# Patient Record
Sex: FEMALE | Race: WHITE | NOT HISPANIC OR LATINO | Employment: UNEMPLOYED | RURAL
[De-identification: names, ages, dates, MRNs, and addresses within clinical notes are randomized per-mention and may not be internally consistent; named-entity substitution may affect disease eponyms.]

---

## 2020-06-29 ENCOUNTER — HISTORICAL (OUTPATIENT)
Dept: ADMINISTRATIVE | Facility: HOSPITAL | Age: 60
End: 2020-06-29

## 2020-06-29 LAB
ALBUMIN SERPL BCP-MCNC: 3.8 G/DL (ref 3.5–5)
ALBUMIN/GLOB SERPL: 1 {RATIO}
ALP SERPL-CCNC: 95 U/L (ref 46–118)
ALT SERPL W P-5'-P-CCNC: 30 U/L (ref 13–56)
AST SERPL W P-5'-P-CCNC: 36 U/L (ref 15–37)
BACTERIA #/AREA URNS HPF: ABNORMAL /HPF
BILIRUB SERPL-MCNC: 0.3 MG/DL (ref 0–1.2)
BILIRUB UR QL STRIP: NEGATIVE MG/DL
BUN SERPL-MCNC: 46 MG/DL (ref 7–18)
BUN/CREAT SERPL: 21.7
CALCIUM SERPL-MCNC: 8.9 MG/DL (ref 8.5–10.1)
CHLORIDE SERPL-SCNC: 100 MMOL/L (ref 98–107)
CLARITY UR: CLEAR
CLARITY UR: CLEAR
CO2 SERPL-SCNC: 25 MMOL/L (ref 21–32)
COLOR UR: YELLOW
COLOR UR: YELLOW
CREAT SERPL-MCNC: 2.12 MG/DL (ref 0.55–1.02)
FLUAV AG UPPER RESP QL IA.RAPID: NEGATIVE
FLUBV AG UPPER RESP QL IA.RAPID: NEGATIVE
GLOBULIN SER-MCNC: 4 G/DL (ref 2–4)
GLUCOSE SERPL-MCNC: 106 MG/DL (ref 74–106)
GLUCOSE UR STRIP-MCNC: NORMAL MG/DL
HYALINE CASTS #/AREA URNS LPF: ABNORMAL /LPF (ref 0–2)
KETONES UR STRIP-SCNC: NEGATIVE MG/DL
LEUKOCYTE ESTERASE UR QL STRIP: ABNORMAL LEU/UL
MAGNESIUM SERPL-MCNC: 2.2 MG/DL (ref 1.7–2.3)
MUCOUS THREADS #/AREA URNS HPF: ABNORMAL /HPF
NITRITE UR QL STRIP: POSITIVE
NT-PROBNP SERPL-MCNC: 145 PG/ML (ref 0–125)
PH UR STRIP: 5 PH UNITS (ref 5–8)
POTASSIUM SERPL-SCNC: 3.4 MMOL/L (ref 3.5–5.1)
PROT SERPL-MCNC: 7.8 G/DL (ref 6.4–8.2)
PROT UR QL STRIP: NEGATIVE MG/DL
RAPID GROUP A STREP: NEGATIVE
RBC # UR STRIP: NEGATIVE ERY/UL
RBC #/AREA URNS HPF: ABNORMAL /HPF (ref 0–3)
SARS-COV+SARS-COV-2 AG RESP QL IA.RAPID: NEGATIVE
SODIUM SERPL-SCNC: 138 MMOL/L (ref 136–145)
SP GR UR STRIP: 1.02 (ref 1–1.03)
SQUAMOUS #/AREA URNS LPF: ABNORMAL /LPF
UROBILINOGEN UR STRIP-ACNC: 0.2 MG/DL
WBC #/AREA URNS HPF: ABNORMAL /HPF (ref 0–5)

## 2020-06-30 ENCOUNTER — HISTORICAL (OUTPATIENT)
Dept: ADMINISTRATIVE | Facility: HOSPITAL | Age: 60
End: 2020-06-30

## 2020-07-02 LAB
REPORT: 38
REPORT: NORMAL

## 2020-10-03 ENCOUNTER — HISTORICAL (OUTPATIENT)
Dept: ADMINISTRATIVE | Facility: HOSPITAL | Age: 60
End: 2020-10-03

## 2020-10-03 LAB
ALBUMIN SERPL BCP-MCNC: 2.9 G/DL (ref 3.5–5)
ALBUMIN/GLOB SERPL: 0.6 {RATIO}
ALP SERPL-CCNC: 119 U/L (ref 46–118)
ALT SERPL W P-5'-P-CCNC: 16 U/L (ref 13–56)
ANION GAP SERPL CALCULATED.3IONS-SCNC: 18 MMOL/L
AST SERPL W P-5'-P-CCNC: 20 U/L (ref 15–37)
BASOPHILS # BLD AUTO: 0.02 X10E3/UL (ref 0–0.2)
BASOPHILS NFR BLD AUTO: 0.1 % (ref 0–1)
BILIRUB SERPL-MCNC: 0.5 MG/DL (ref 0–1.2)
BUN SERPL-MCNC: 30 MG/DL (ref 7–18)
BUN/CREAT SERPL: 10.9
CALCIUM SERPL-MCNC: 8.9 MG/DL (ref 8.5–10.1)
CHLORIDE SERPL-SCNC: 97 MMOL/L (ref 98–107)
CO2 SERPL-SCNC: 25 MMOL/L (ref 21–32)
CREAT SERPL-MCNC: 2.75 MG/DL (ref 0.55–1.02)
D DIMER PPP FEU-MCNC: 3.57 UG/ML (ref 0–0.47)
EOSINOPHIL # BLD AUTO: 0.01 X10E3/UL (ref 0–0.5)
EOSINOPHIL NFR BLD AUTO: 0 % (ref 1–4)
ERYTHROCYTE [DISTWIDTH] IN BLOOD BY AUTOMATED COUNT: 12.8 % (ref 11.5–14.5)
GLOBULIN SER-MCNC: 4.9 G/DL (ref 2–4)
GLUCOSE SERPL-MCNC: 145 MG/DL (ref 74–106)
HCT VFR BLD AUTO: 38.5 % (ref 38–47)
HGB BLD-MCNC: 13 G/DL (ref 12–16)
IMM GRANULOCYTES # BLD AUTO: 0.09 X10E3/UL (ref 0–0.04)
IMM GRANULOCYTES NFR BLD: 0.4 % (ref 0–0.4)
LACTATE SERPL-SCNC: 5.7 MMOL/L (ref 0.4–2)
LYMPHOCYTES # BLD AUTO: 1.18 X10E3/UL (ref 1–4.8)
LYMPHOCYTES NFR BLD AUTO: 5.4 % (ref 27–41)
MAGNESIUM SERPL-MCNC: 1.9 MG/DL (ref 1.7–2.3)
MCH RBC QN AUTO: 31.8 PG (ref 27–31)
MCHC RBC AUTO-ENTMCNC: 33.8 G/DL (ref 32–36)
MCV RBC AUTO: 94.1 FL (ref 80–96)
MONOCYTES # BLD AUTO: 1.69 X10E3/UL (ref 0–0.8)
MONOCYTES NFR BLD AUTO: 7.7 % (ref 2–6)
MPC BLD CALC-MCNC: 10.5 FL (ref 9.4–12.4)
NEUTROPHILS # BLD AUTO: 19 X10E3/UL (ref 1.8–7.7)
NEUTROPHILS NFR BLD AUTO: 86.4 % (ref 53–65)
PLATELET # BLD AUTO: 140 X10E3/UL (ref 150–400)
POTASSIUM SERPL-SCNC: 3.7 MMOL/L (ref 3.5–5.1)
PROT SERPL-MCNC: 7.8 G/DL (ref 6.4–8.2)
RBC # BLD AUTO: 4.09 X10E6/UL (ref 4.2–5.4)
SARS-COV+SARS-COV-2 AG RESP QL IA.RAPID: NEGATIVE
SODIUM SERPL-SCNC: 136 MMOL/L (ref 136–145)
WBC # BLD AUTO: 21.99 X10E3/UL (ref 4.5–11)

## 2020-10-04 ENCOUNTER — HISTORICAL (OUTPATIENT)
Dept: ADMINISTRATIVE | Facility: HOSPITAL | Age: 60
End: 2020-10-04

## 2020-10-04 LAB
BACTERIA #/AREA URNS HPF: ABNORMAL /HPF
BILIRUB UR QL STRIP: NEGATIVE MG/DL
CLARITY UR: ABNORMAL
CLARITY UR: ABNORMAL
COLOR UR: YELLOW
COLOR UR: YELLOW
GLUCOSE UR STRIP-MCNC: NORMAL MG/DL
KETONES UR STRIP-SCNC: NEGATIVE MG/DL
LEUKOCYTE ESTERASE UR QL STRIP: ABNORMAL LEU/UL
NITRITE UR QL STRIP: POSITIVE
PH UR STRIP: 5.5 PH UNITS (ref 5–8)
PROT UR QL STRIP: >=300 MG/DL
RBC # UR STRIP: ABNORMAL ERY/UL
RBC #/AREA URNS HPF: ABNORMAL /HPF (ref 0–3)
SP GR UR STRIP: >=1.03 (ref 1–1.03)
SQUAMOUS #/AREA URNS LPF: ABNORMAL /LPF
UROBILINOGEN UR STRIP-ACNC: NORMAL MG/DL
WBC #/AREA URNS HPF: ABNORMAL /HPF (ref 0–5)

## 2020-10-05 ENCOUNTER — HISTORICAL (OUTPATIENT)
Dept: ADMINISTRATIVE | Facility: HOSPITAL | Age: 60
End: 2020-10-05

## 2020-10-06 LAB
REPORT: 38
REPORT: NORMAL

## 2020-10-07 LAB
REPORT: 38
REPORT: 38
REPORT: NORMAL

## 2021-08-08 ENCOUNTER — HOSPITAL ENCOUNTER (EMERGENCY)
Facility: HOSPITAL | Age: 61
Discharge: HOME | End: 2021-08-08
Attending: SURGERY
Payer: MEDICAID

## 2021-08-08 VITALS
SYSTOLIC BLOOD PRESSURE: 199 MMHG | RESPIRATION RATE: 20 BRPM | HEIGHT: 64 IN | TEMPERATURE: 102 F | HEART RATE: 86 BPM | DIASTOLIC BLOOD PRESSURE: 97 MMHG | OXYGEN SATURATION: 98 % | BODY MASS INDEX: 34.15 KG/M2 | WEIGHT: 200 LBS

## 2021-08-08 DIAGNOSIS — R50.9 FEVER, UNSPECIFIED FEVER CAUSE: ICD-10-CM

## 2021-08-08 DIAGNOSIS — J02.8 SORE THROAT (VIRAL): ICD-10-CM

## 2021-08-08 DIAGNOSIS — B97.89 SORE THROAT (VIRAL): ICD-10-CM

## 2021-08-08 DIAGNOSIS — B34.9 VIRAL SYNDROME: Primary | ICD-10-CM

## 2021-08-08 LAB
FLUAV AG UPPER RESP QL IA.RAPID: NEGATIVE
FLUBV AG UPPER RESP QL IA.RAPID: NEGATIVE
SARS-COV+SARS-COV-2 AG RESP QL IA.RAPID: NEGATIVE

## 2021-08-08 PROCEDURE — 87428 SARSCOV & INF VIR A&B AG IA: CPT | Performed by: SURGERY

## 2021-08-08 PROCEDURE — 99282 EMERGENCY DEPT VISIT SF MDM: CPT | Mod: ,,, | Performed by: SURGERY

## 2021-08-08 PROCEDURE — 99282 PR EMERGENCY DEPT VISIT,LEVEL II: ICD-10-PCS | Mod: ,,, | Performed by: SURGERY

## 2021-08-08 PROCEDURE — 99283 EMERGENCY DEPT VISIT LOW MDM: CPT

## 2021-08-08 RX ORDER — ESTRADIOL 0.1 MG/D
1 FILM, EXTENDED RELEASE TRANSDERMAL
COMMUNITY

## 2021-08-08 RX ORDER — MULTIVITAMIN
1 TABLET ORAL DAILY
COMMUNITY

## 2021-08-08 RX ORDER — LEVOTHYROXINE SODIUM 150 UG/1
150 TABLET ORAL DAILY
COMMUNITY

## 2021-08-09 ENCOUNTER — TELEPHONE (OUTPATIENT)
Dept: EMERGENCY MEDICINE | Facility: HOSPITAL | Age: 61
End: 2021-08-09

## 2021-08-10 RX ORDER — DIPHENHYDRAMINE HYDROCHLORIDE 50 MG/ML
25 INJECTION INTRAMUSCULAR; INTRAVENOUS ONCE AS NEEDED
Status: CANCELLED | OUTPATIENT
Start: 2021-08-10 | End: 2033-01-06

## 2021-08-10 RX ORDER — ALBUTEROL SULFATE 90 UG/1
2 AEROSOL, METERED RESPIRATORY (INHALATION)
Status: CANCELLED | OUTPATIENT
Start: 2021-08-10

## 2021-08-10 RX ORDER — ACETAMINOPHEN 325 MG/1
650 TABLET ORAL ONCE AS NEEDED
Status: CANCELLED | OUTPATIENT
Start: 2021-08-10 | End: 2033-01-06

## 2021-08-10 RX ORDER — SODIUM CHLORIDE 0.9 % (FLUSH) 0.9 %
10 SYRINGE (ML) INJECTION
Status: CANCELLED | OUTPATIENT
Start: 2021-08-10

## 2021-08-10 RX ORDER — ONDANSETRON 4 MG/1
4 TABLET, ORALLY DISINTEGRATING ORAL ONCE AS NEEDED
Status: CANCELLED | OUTPATIENT
Start: 2021-08-10 | End: 2033-01-06

## 2021-08-11 ENCOUNTER — INFUSION (OUTPATIENT)
Dept: INFECTIOUS DISEASES | Facility: HOSPITAL | Age: 61
End: 2021-08-11
Attending: INTERNAL MEDICINE
Payer: MEDICAID

## 2021-08-11 VITALS
DIASTOLIC BLOOD PRESSURE: 67 MMHG | HEART RATE: 71 BPM | OXYGEN SATURATION: 96 % | HEIGHT: 64 IN | BODY MASS INDEX: 32.44 KG/M2 | TEMPERATURE: 98 F | WEIGHT: 190 LBS | SYSTOLIC BLOOD PRESSURE: 100 MMHG | RESPIRATION RATE: 18 BRPM

## 2021-08-11 DIAGNOSIS — U07.1 COVID-19: Primary | ICD-10-CM

## 2021-08-11 PROCEDURE — 63600175 PHARM REV CODE 636 W HCPCS: Performed by: INTERNAL MEDICINE

## 2021-08-11 PROCEDURE — 25000003 PHARM REV CODE 250: Performed by: INTERNAL MEDICINE

## 2021-08-11 PROCEDURE — M0243 CASIRIVI AND IMDEVI INFUSION: HCPCS | Performed by: INTERNAL MEDICINE

## 2021-08-11 RX ORDER — EPINEPHRINE 0.3 MG/.3ML
0.3 INJECTION SUBCUTANEOUS
Status: ACTIVE | OUTPATIENT
Start: 2021-08-11

## 2021-08-11 RX ORDER — ALBUTEROL SULFATE 90 UG/1
2 AEROSOL, METERED RESPIRATORY (INHALATION)
Status: ACTIVE | OUTPATIENT
Start: 2021-08-11

## 2021-08-11 RX ORDER — ACETAMINOPHEN 325 MG/1
650 TABLET ORAL ONCE AS NEEDED
Status: COMPLETED | OUTPATIENT
Start: 2021-08-11 | End: 2021-08-11

## 2021-08-11 RX ORDER — SODIUM CHLORIDE 0.9 % (FLUSH) 0.9 %
10 SYRINGE (ML) INJECTION
Status: ACTIVE | OUTPATIENT
Start: 2021-08-11

## 2021-08-11 RX ORDER — DIPHENHYDRAMINE HYDROCHLORIDE 50 MG/ML
25 INJECTION INTRAMUSCULAR; INTRAVENOUS ONCE AS NEEDED
Status: ACTIVE | OUTPATIENT
Start: 2021-08-11 | End: 2033-01-06

## 2021-08-11 RX ORDER — ACETAMINOPHEN 325 MG/1
TABLET ORAL
Status: DISCONTINUED
Start: 2021-08-11 | End: 2021-08-11 | Stop reason: HOSPADM

## 2021-08-11 RX ORDER — ONDANSETRON 4 MG/1
4 TABLET, ORALLY DISINTEGRATING ORAL ONCE AS NEEDED
Status: ACTIVE | OUTPATIENT
Start: 2021-08-11 | End: 2033-01-06

## 2021-08-11 RX ADMIN — ACETAMINOPHEN 650 MG: 325 TABLET, FILM COATED ORAL at 05:08

## 2021-08-11 RX ADMIN — CASIRIVIMAB AND IMDEVIMAB 600 MG: 600; 600 INJECTION, SOLUTION, CONCENTRATE INTRAVENOUS at 05:08

## 2021-10-07 DIAGNOSIS — Z96.651 S/P TOTAL KNEE REPLACEMENT, RIGHT: Primary | ICD-10-CM

## 2021-10-12 ENCOUNTER — CLINICAL SUPPORT (OUTPATIENT)
Dept: REHABILITATION | Facility: HOSPITAL | Age: 61
End: 2021-10-12
Payer: MEDICAID

## 2021-10-12 DIAGNOSIS — R29.898 RIGHT LEG WEAKNESS: ICD-10-CM

## 2021-10-12 DIAGNOSIS — M25.561 ACUTE PAIN OF RIGHT KNEE: ICD-10-CM

## 2021-10-12 DIAGNOSIS — Z96.651 S/P TOTAL KNEE REPLACEMENT, RIGHT: ICD-10-CM

## 2021-10-12 PROCEDURE — 97161 PT EVAL LOW COMPLEX 20 MIN: CPT

## 2021-10-12 PROCEDURE — 97110 THERAPEUTIC EXERCISES: CPT

## 2021-10-12 PROCEDURE — 97116 GAIT TRAINING THERAPY: CPT

## 2021-10-14 ENCOUNTER — CLINICAL SUPPORT (OUTPATIENT)
Dept: REHABILITATION | Facility: HOSPITAL | Age: 61
End: 2021-10-14
Payer: MEDICAID

## 2021-10-14 DIAGNOSIS — R29.898 RIGHT LEG WEAKNESS: ICD-10-CM

## 2021-10-14 DIAGNOSIS — M25.561 ACUTE PAIN OF RIGHT KNEE: ICD-10-CM

## 2021-10-14 PROCEDURE — 97530 THERAPEUTIC ACTIVITIES: CPT

## 2021-10-14 PROCEDURE — 97110 THERAPEUTIC EXERCISES: CPT

## 2021-10-18 ENCOUNTER — DOCUMENTATION ONLY (OUTPATIENT)
Dept: REHABILITATION | Facility: HOSPITAL | Age: 61
End: 2021-10-18

## 2021-10-18 PROBLEM — R29.898 RIGHT LEG WEAKNESS: Status: RESOLVED | Noted: 2021-10-12 | Resolved: 2021-10-18

## 2021-10-18 PROBLEM — M25.561 RIGHT KNEE PAIN: Status: RESOLVED | Noted: 2021-10-12 | Resolved: 2021-10-18

## 2023-03-15 DIAGNOSIS — Z89.611 HX OF AKA (ABOVE KNEE AMPUTATION), RIGHT: Primary | ICD-10-CM

## 2023-03-16 ENCOUNTER — CLINICAL SUPPORT (OUTPATIENT)
Dept: REHABILITATION | Facility: HOSPITAL | Age: 63
End: 2023-03-16
Payer: MEDICAID

## 2023-03-16 DIAGNOSIS — R52 PAIN: ICD-10-CM

## 2023-03-16 DIAGNOSIS — Z89.611 HX OF AKA (ABOVE KNEE AMPUTATION), RIGHT: ICD-10-CM

## 2023-03-16 PROCEDURE — 97116 GAIT TRAINING THERAPY: CPT

## 2023-03-16 PROCEDURE — 97110 THERAPEUTIC EXERCISES: CPT

## 2023-03-16 PROCEDURE — 97162 PT EVAL MOD COMPLEX 30 MIN: CPT

## 2023-03-16 NOTE — PLAN OF CARE
[facility]  Physical Therapy Initial Evaluation    Name: Leigha Nieves  Clinic Number: [id number]    Therapy Diagnosis:   Encounter Diagnosis   Name Primary?    Hx of AKA (above knee amputation), right      Physician: Jose Manuel Car II, MD    Physician Orders: PT Eval and Treat   Medical Diagnosis from Referral: History of AKA (above knee amputation), R   Evaluation Date: 3/16/2023  Authorization Period Expiration: 3/14/2024  Plan of Care Expiration: 4/14/2023  Visit # / Visits authorized: 1/8    Time In: 14:00  Time Out: 14:49  Total Appointment Time (timed & untimed codes): 49 minutes    Precautions: Fall    Subjective   Date of onset: 2/27/2023  History of current condition - Leigha reports: Patient s/p R AKA on 2/27/2023. Patient has had multiple knee surgeries that started 10/7/2021 with R TKR. Patient had infection in replacement that would not heal despite MD max efforts resulting in eight surgeries. Patient released from the hospital 3/14/2023. Patient unsure of when to return to MD. Patient reports planning to get prosthesis. Patient reports extreme phantom pains. Patient reports there will be a law suit surrounding initial surgery. Patient referred for post amputation care.      Medical History:   Past Medical History:   Diagnosis Date    Arthritis     Depression     Migraine headache        Surgical History:   Leigha Nieves  has a past surgical history that includes Joint replacement and Hysterectomy.    Medications:   Leigha has a current medication list which includes the following prescription(s): estradiol, levothyroxine, multivitamin, and oxymorphone hcl, and the following Facility-Administered Medications: albuterol, diphenhydramine, epinephrine, methylprednisolone sodium succinate, ondansetron, sodium chloride 0.9% 500 mL flush bag, sodium chloride 0.9% 500 mL flush bag, and sodium chloride 0.9%.    Allergies:   Review of patient's allergies indicates:   Allergen Reactions    Phenergan  "plain     Toradol [ketorolac]         Imaging, - patient unsure, possible scan of head    Prior Therapy: on R LE following replacement for 2 visits   Social History:  lives with an adult   Occupation: unemployed on disability- unsure on reason of disability  Prior Level of Function: active  Current Level of Function: sedentary    Pain:  Current 7/10, worst 9/10, best 7/10   Location: right LE   Description: Throbbing and Shooting, sharp   Aggravating Factors: Sitting without support  Easing Factors: rest and elevation    Pts goals: "get strong"     Objective     Incisions: covered with fresh bandages  Girth Measurements: Right 61 cm Left 51 cm      Range of motion:  Motion Right Left    Hip flexion   100  WITHIN FUNCTIONAL LIMITS   Hip extension   25  WITHIN FUNCTIONAL LIMITS   Hip abduction   45  WITHIN FUNCTIONAL LIMITS   Hip adduction  WITHIN FUNCTIONAL LIMITS  WITHIN FUNCTIONAL LIMITS       Manual muscle test   Muscle Right  Left    Hip flexion  MMT strength: 4-/5  MMT strength: 4/5   Hip extension  MMT strength: 4-/5  MMT strength: 4/5   Hip abduction  MMT strength: 3+/5  MMT strength: 4/5   Hip adduction  MMT strength: 4-/5  MMT strength: 4+/5       Gait:  Assistive device: rolling walker  Ambulation distance and deviations: 35' with increased L step length, increased speed, head down posture  Stairs: not performed  Comments: gait belt used with cuing to decrease speed of movement to improve safety      Clinical Special Tests:  N/A post op      TREATMENT   Treatment Time In: 14:17  Treatment Time Out: 14:49  Total Treatment time (time-based codes) separate from Evaluation: 32 minutes    Leigha received the treatments listed below:  THERAPEUTIC EXERCISES to develop strength, endurance, ROM, flexibility, posture, and core stabilization for 24 minutes including see flowsheet below  GAIT TRAINING to improve functional mobility and safety for 8 minutes.      Ther- Ex    Nustep/Bike    Hamstring Stretch  " "  Heelslides    Quad sets  20 x 3"   Glute sets 20 x 3"   Straight Leg Raises 2 x 10    Sidelying hip abduction 2 x 10   Prone hip extension Unable due to cramping   Sidelying hip circles 15 x (cramping unable to do more)               Ther-Act    3 way hip    Sit <> stands             Bed mobility performed        Home Exercises and Patient Education Provided    Education provided:   - eval findings, Plan of Care, Home exercise program     Written Home Exercises Provided: yes.  Exercises were reviewed and Leigha was able to demonstrate them prior to the end of the session.  Leigha demonstrated fair  understanding of the education provided.     See EMR under Patient Instructions for exercises provided 3/16/2023.    Assessment   Leigha is a 63 y.o. female referred to outpatient Physical Therapy with a medical diagnosis of R AKA. Pt presents with increased pain, decreased ROM, increased edema, and decreased strength that limits functional mobility. Patient with plans to have prosthesis as healing allows. Patient performed therapeutic exercises with max verbal and visual cuing. Patient with difficulty activating proper musculature to perform therapeutic exercises despite cuing. During exercises, patient experienced LE cramping that limited further repetitions. Gait performed with gait belt and increased fall risk due to impulsivity and decreased safety awareness. No adverse effects from treatment noted or reported.     Pt prognosis is Good.   Pt will benefit from skilled outpatient Physical Therapy to address the deficits stated above and in the chart below, provide pt/family education, and to maximize pt's level of independence.     Plan of care discussed with patient: Yes  Pt's spiritual, cultural and educational needs considered and patient is agreeable to the plan of care and goals as stated below:     Anticipated Barriers for therapy: compliance with Home exercise program, decreased safety awareness "     Goals:  Patient will increase R hip flexion to 115 degrees to allow patient to perform activities of daily living without limitations  Patient will increase B lower extremity strength to 4+/5 to allow patient to ambulate without deviations  Patient will report decrease in pain of R LE to 4/10 to improve quality of life  Patient will ascend and descend 4 steps with B handrails with sup assistance to allow patient to safely maneuver environment     Plan   Plan of care Certification: 3/16/2023 to 4/14/2023.    Outpatient Physical Therapy 2 times weekly for 4 weeks to include the following interventions: Electrical Stimulation IFC/Biphasic, Gait Training, Manual Therapy, Moist Heat/ Ice, Neuromuscular Re-ed, Orthotic Management and Training, Patient Education, Self Care, Therapeutic Activities, and Therapeutic Exercise.     Michelle Alvarez, PT, DPT 3/16/2023      Physician Signature: ____________________________________________ Date: __________

## 2023-03-30 ENCOUNTER — CLINICAL SUPPORT (OUTPATIENT)
Dept: REHABILITATION | Facility: HOSPITAL | Age: 63
End: 2023-03-30
Payer: MEDICAID

## 2023-03-30 DIAGNOSIS — R52 PAIN: Primary | ICD-10-CM

## 2023-03-30 PROCEDURE — 97110 THERAPEUTIC EXERCISES: CPT

## 2023-03-30 PROCEDURE — 97530 THERAPEUTIC ACTIVITIES: CPT

## 2023-03-30 NOTE — PLAN OF CARE
Patient was admitted into ICU on 3/19/2023 according to med notes in Epic system. Patient unsure of dates and does not recall hospitalization upon first entering clinic. Patient reports not feeling well but transferred self to mat. No mobility changes noted post hospitalization with bed mobility but gait not attempted due to BP issues that arose during treatment. PT re-eval to be attempted but unable due to inability to fully assess gait. Continue treatment as warranted by eval if able at next visit if continued BP unstable. D/C back to MD.

## 2023-03-30 NOTE — PROGRESS NOTES
"  [facility]   Physical Therapy Treatment Note     Name: Leigha Nieves  Clinic Number: [id number]    Therapy Diagnosis:   Encounter Diagnosis   Name Primary?    Pain Yes     Physician: Jose Manuel Car II, MD    Visit Date: 3/30/2023    Physician Orders: PT Eval and Treat   Medical Diagnosis from Referral: History of AKA (above knee amputation), R   Evaluation Date: 3/16/2023  Authorization Period Expiration: 3/14/2024  Plan of Care Expiration: 4/14/2023  Visit # / Visits authorized: 2/8    PTA Visit #: 0/5     Time In: 12:07  Time Out: 12:41  Total Billable Time: 34 minutes    SUBJECTIVE     Pt reports: "I don't feel good today." PT questioned patient about time in the hospital and patient stated she was not in the hospital. PT found notes reported ICU stay. See documentation in treatment tab for more information.     Patient rolls into clinic in W/C with pale appearance and reports of feeling poorly. BP was checked with readings 101/71 and heart rate 80. "Since I am here, I am going to do something." Patient transferred to [facility].     She was not compliant with home exercise program.  Response to previous treatment: unknown  Functional change: too early in POC    Pain: 5/10  Location: right upper legs     OBJECTIVE     Objective Measures updated at progress report unless specified.     Treatment     Leigha received the treatments listed below:      THERAPEUTIC EXERCISES to develop strength, endurance, ROM, flexibility, posture, and core stabilization including see flowsheet below        Ther- Ex     Nustep/Bike     Hamstring Stretch     Heelslides     Quad sets  20 x 3"   Glute sets 20 x 3"   Straight Leg Raises 2 x 10    Sidelying hip abduction 2 x 10   Prone hip extension    Sidelying hip circles                      Ther-Act     3 way hip     Sit <> stands                  Bed mobility performed            Patient Education and Home Exercises     Home Exercises Provided and Patient " "Education Provided     Education provided:   - Blood pressure and importance of going to ER in emergency    Written Home Exercises Provided: Patient instructed to cont prior HEP. Exercises were reviewed and Leigha was able to demonstrate them prior to the end of the session.  Leigha demonstrated good  understanding of the education provided. See EMR under Patient Instructions for exercises provided during therapy sessions    ASSESSMENT     Leigha is a 63 y.o. female referred to outpatient Physical Therapy with a medical diagnosis of R AKA. Pt presents with increased pain, decreased ROM, increased edema, and decreased strength that limits functional mobility. Patient with plans to have prosthesis as healing allows. During treatment patient begins reporting that she feel like she is going to vomit and immediately sits edge of mat. PT remains CGA and recommends patient returns to sidelying on mat due to decreased color in face. Patient reports passing out and quickly returned to mat though staying concious. PT checked BP with reading 97/55 and heart rate 76. Following 5 minutes BP checked again with reading 79/45 and heart rate 74. Patient immediately jumped up following second reading stately "I am not going back to the ICU. I am leaving." PT assisted patient back to laying on mat due to patient unable to stand and encouraging patient that it was medically not safe to leave at this time. Patient requested that PT contact her friend to bring her water in. Once "Bull" (patient's friend) arrived, BP reading was 89/64 with heart rate 79. Patient jumped in wheelchair to leave. PT told patient that she should seek medical care. Patient refused so PT encouraged patient to check BP frequently. Patient left with Chuy in wheelchair to go home not seeking further care against PT advice.     Leigha Is progressing well towards her goals.   Pt prognosis is Good.     Pt will continue to benefit from skilled outpatient physical therapy " to address the deficits listed in the problem list box on initial evaluation, provide pt/family education and to maximize pt's level of independence in the home and community environment.     Pt's spiritual, cultural and educational needs considered and pt agreeable to plan of care and goals.     Anticipated Barriers for therapy: compliance with Home exercise program, decreased safety awareness      Goals:  Patient will increase R hip flexion to 115 degrees to allow patient to perform activities of daily living without limitations  Patient will increase B lower extremity strength to 4+/5 to allow patient to ambulate without deviations  Patient will report decrease in pain of R LE to 4/10 to improve quality of life  Patient will ascend and descend 4 steps with B handrails with sup assistance to allow patient to safely maneuver environment     PLAN     Plan of care Certification: 3/16/2023 to 4/14/2023.     Outpatient Physical Therapy 2 times weekly for 4 weeks to include the following interventions: Electrical Stimulation IFC/Biphasic, Gait Training, Manual Therapy, Moist Heat/ Ice, Neuromuscular Re-ed, Orthotic Management and Training, Patient Education, Self Care, Therapeutic Activities, and Therapeutic Exercise.     Michelle Alvarez, PT, DPT 3/30/2023